# Patient Record
Sex: FEMALE | Employment: OTHER
[De-identification: names, ages, dates, MRNs, and addresses within clinical notes are randomized per-mention and may not be internally consistent; named-entity substitution may affect disease eponyms.]

---

## 2022-07-30 ENCOUNTER — TELEPHONE ENCOUNTER (OUTPATIENT)
Age: 77
End: 2022-07-30

## 2022-07-31 ENCOUNTER — TELEPHONE ENCOUNTER (OUTPATIENT)
Age: 77
End: 2022-07-31

## 2023-11-03 PROBLEM — M54.50 LOWER BACK PAIN: Status: ACTIVE | Noted: 2023-11-03

## 2023-11-03 PROBLEM — K43.2 INCISIONAL HERNIA OF ANTERIOR ABDOMINAL WALL WITHOUT OBSTRUCTION OR GANGRENE: Status: ACTIVE | Noted: 2023-11-03

## 2023-11-03 PROBLEM — M70.61 TROCHANTERIC BURSITIS OF BOTH HIPS: Status: ACTIVE | Noted: 2018-03-15

## 2023-11-03 PROBLEM — M70.62 TROCHANTERIC BURSITIS OF BOTH HIPS: Status: ACTIVE | Noted: 2018-03-15

## 2023-11-03 RX ORDER — HYDROXYCHLOROQUINE SULFATE 200 MG/1
TABLET, FILM COATED ORAL
COMMUNITY
End: 2024-02-16 | Stop reason: ALTCHOICE

## 2023-11-03 RX ORDER — CHOLECALCIFEROL (VITAMIN D3) 25 MCG
1 TABLET ORAL DAILY
COMMUNITY

## 2023-11-03 RX ORDER — COLCHICINE 0.6 MG/1
1 TABLET ORAL 2 TIMES DAILY
COMMUNITY
Start: 2016-12-13 | End: 2024-02-16 | Stop reason: ALTCHOICE

## 2023-11-03 RX ORDER — HYDROCHLOROTHIAZIDE 25 MG/1
TABLET ORAL
COMMUNITY
Start: 2017-07-19 | End: 2024-02-16 | Stop reason: ALTCHOICE

## 2023-11-03 RX ORDER — LEVOTHYROXINE SODIUM 100 UG/1
100 TABLET ORAL EVERY MORNING
COMMUNITY

## 2023-11-03 RX ORDER — EVOLOCUMAB 140 MG/ML
INJECTION, SOLUTION SUBCUTANEOUS
COMMUNITY

## 2023-11-03 RX ORDER — BENAZEPRIL HYDROCHLORIDE 20 MG/1
TABLET ORAL
COMMUNITY
Start: 2017-08-23 | End: 2024-02-16 | Stop reason: ALTCHOICE

## 2023-11-03 RX ORDER — CHLORTHALIDONE 25 MG/1
25 TABLET ORAL EVERY MORNING
COMMUNITY

## 2023-11-03 RX ORDER — VALSARTAN 40 MG/1
40 TABLET ORAL DAILY
COMMUNITY

## 2023-11-06 ENCOUNTER — OFFICE VISIT (OUTPATIENT)
Dept: GASTROENTEROLOGY | Facility: CLINIC | Age: 78
End: 2023-11-06
Payer: MEDICARE

## 2023-11-06 VITALS — BODY MASS INDEX: 34.07 KG/M2 | WEIGHT: 169 LBS | HEIGHT: 59 IN | HEART RATE: 66 BPM

## 2023-11-06 DIAGNOSIS — K58.0 IRRITABLE BOWEL SYNDROME WITH DIARRHEA: Primary | ICD-10-CM

## 2023-11-06 DIAGNOSIS — K21.9 GASTROESOPHAGEAL REFLUX DISEASE, UNSPECIFIED WHETHER ESOPHAGITIS PRESENT: ICD-10-CM

## 2023-11-06 PROCEDURE — 1036F TOBACCO NON-USER: CPT | Performed by: INTERNAL MEDICINE

## 2023-11-06 PROCEDURE — 1159F MED LIST DOCD IN RCRD: CPT | Performed by: INTERNAL MEDICINE

## 2023-11-06 PROCEDURE — 1160F RVW MEDS BY RX/DR IN RCRD: CPT | Performed by: INTERNAL MEDICINE

## 2023-11-06 PROCEDURE — 99204 OFFICE O/P NEW MOD 45 MIN: CPT | Performed by: INTERNAL MEDICINE

## 2023-11-06 RX ORDER — VERAPAMIL HYDROCHLORIDE 120 MG/1
120 TABLET, FILM COATED ORAL DAILY
COMMUNITY
Start: 2023-09-20

## 2023-11-06 RX ORDER — DESIPRAMINE HYDROCHLORIDE 10 MG/1
10 TABLET ORAL NIGHTLY
Qty: 30 TABLET | Refills: 2 | Status: SHIPPED | OUTPATIENT
Start: 2023-11-06 | End: 2024-01-24

## 2023-11-06 RX ORDER — DILTIAZEM HYDROCHLORIDE 120 MG/1
120 CAPSULE, COATED, EXTENDED RELEASE ORAL DAILY
COMMUNITY
Start: 2022-12-11 | End: 2022-12-18 | Stop reason: WASHOUT

## 2023-11-06 RX ORDER — ALBUTEROL SULFATE 90 UG/1
2 AEROSOL, METERED RESPIRATORY (INHALATION) EVERY 4 HOURS PRN
COMMUNITY
Start: 2023-01-18

## 2023-11-06 RX ORDER — FLUTICASONE PROPIONATE 50 MCG
1 SPRAY, SUSPENSION (ML) NASAL DAILY
COMMUNITY
Start: 2023-01-18

## 2023-11-06 RX ORDER — POTASSIUM CHLORIDE 750 MG/1
30 TABLET, EXTENDED RELEASE ORAL DAILY
COMMUNITY
Start: 2023-09-20

## 2023-11-06 RX ORDER — FAMOTIDINE 20 MG/1
20 TABLET, FILM COATED ORAL DAILY
Qty: 90 TABLET | Refills: 2 | Status: SHIPPED | OUTPATIENT
Start: 2023-11-06 | End: 2024-08-02

## 2023-11-06 RX ORDER — METHOCARBAMOL 500 MG/1
500 TABLET, FILM COATED ORAL 2 TIMES DAILY PRN
COMMUNITY
Start: 2023-03-21 | End: 2024-02-16 | Stop reason: ALTCHOICE

## 2023-11-06 RX ORDER — OMEPRAZOLE 20 MG/1
20 CAPSULE, DELAYED RELEASE ORAL DAILY
COMMUNITY
Start: 2023-05-19

## 2023-11-06 RX ORDER — CIMETIDINE 300 MG/1
300 TABLET, FILM COATED ORAL NIGHTLY
COMMUNITY
Start: 2022-12-07 | End: 2024-02-16 | Stop reason: ALTCHOICE

## 2023-11-06 RX ORDER — PREDNISONE 20 MG/1
20 TABLET ORAL DAILY
COMMUNITY
Start: 2022-12-07

## 2023-11-06 NOTE — PROGRESS NOTES
REASON FOR VISIT: To discuss history of IBS and reflux    HPI:  Renee Gonsalez is a 78 y.o. female with a past medical history of arthritis, obesity, IBS with diarrhea predominant symptoms and GERD being evaluated in the office for history of chronic IBS with intermittent diarrhea symptoms as well as chronic GERD.  Has history of reflux esophagitis on prior upper endoscopies.  Reports last colonoscopy was about 6 years ago and no polyps then.  Prior evaluation and work-up at the [facility].  Has not had any melena or bright red blood per rectum.  Will occasionally use Pepcid for reflux symptoms about 3 days a week.  No family history of inflammatory bowel disease or colorectal cancer.          PRIOR ENDOSCOPY    PAST MEDICAL HISTORY  No past medical history on file.    PAST SURGICAL HISTORY  No past surgical history on file.    FAMILY HISTORY  No family history on file.    SOCIAL HISTORY  Social History     Tobacco Use    Smoking status: Never     Passive exposure: Never    Smokeless tobacco: Never   Substance Use Topics    Alcohol use: Not on file       REVIEW OF SYSTEMS  CONSTITUTIONAL: negative for fever, chills, fatigue, or unintentional weight loss,   HEENT: negative for icteric sclera, eye pain/redness, or changes in vision/hearing  RESPIRATORY: negative for cough, hemoptysis, wheezing, orthopnea, or dyspnea on exertion  CARDIOVASCULAR: negative for chest pain, palpitations, or syncope   GASTROINTESTINAL: as noted per HPI  GENITOURINARY: negative for dysuria, polyuria, incontinence, or hematuria  MUSCULOSKELETAL: negative for arthralgia, myalgia, or joint swelling/stiffness   INTEGUMENTARY/SKIN: negative jaundice, rash, or skin lesion  HEMATOLOGIC/LYMPHATIC: negative for prolonged bleeding, easy bruising, or swollen lymph nodes  ENDOCRINE: negative for cold/heat intolerance, polydipsia, polyuria, or goiter  NEUROLOGIC: negative for headaches, dizziness, tremor, or gait abnormality  PSYCHIATRIC: negative  for anxiety, depression, personality changes, or sleep disturbances      A 10 point review of systems was completed and was otherwise negative.    ALLERGIES  Allergies   Allergen Reactions    Benzonatate Anaphylaxis    Diltiazem Hcl Anaphylaxis, Hives, Itching and Swelling    Doxycycline Anaphylaxis, Hives, Itching, Swelling and Rash    Metoprolol Anaphylaxis, Dermatitis, Hives, Rash and Swelling    Clindamycin Diarrhea, GI Upset and Nausea/vomiting    Gabapentin GI Upset and Unknown    Hydrocodone-Acetaminophen Nausea/vomiting    Macrolide Antibiotics GI Upset and Unknown    Opioids - Morphine Analogues Agitation, GI Upset, Hallucinations and Nausea/vomiting    Pneumococcal 23-Venus Ps Vaccine Bleeding, Diarrhea, GI Upset, Myalgia and Nausea/vomiting    Statins-Hmg-Coa Reductase Inhibitors Diarrhea and Myalgia       MEDICATIONS  Current Outpatient Medications   Medication Sig Dispense Refill    BIOTIN ORAL       chlorthalidone (Hygroton) 25 mg tablet Take 1 tablet (25 mg) by mouth once daily in the morning. With food. For 30 days      cholecalciferol (Vitamin D3) 25 MCG (1000 UT) tablet Take 1 tablet (25 mcg) by mouth once daily. For 30 days      evolocumab (Repatha SureClick) 140 mg/mL injection Inject under the skin 1 (one) time per week. As directed      levothyroxine (Synthroid) 100 mcg tablet Take 1 tablet (100 mcg) by mouth once daily in the morning. On an empty stomach. For 30 days      potassium chloride CR 10 mEq ER tablet Take 3 tablets (30 mEq) by mouth once daily.      TURMERIC ORAL       valsartan (Diovan) 40 mg tablet Take 1 tablet (40 mg) by mouth once daily.      verapamil (Calan) 120 mg tablet Take 1 tablet (120 mg) by mouth once daily.      albuterol 90 mcg/actuation inhaler Inhale 2 puffs every 4 hours if needed.      benazepril (Lotensin) 20 mg tablet Benazepril HCl - 20 MG Oral Tablet   Quantity: 90  Refills: 0        Start : 23-Aug-2017   Active      cimetidine (Tagamet) 300 mg tablet Take 1  "tablet (300 mg) by mouth once daily at bedtime.      colchicine, gout, 0.6 mg tablet Take 1 tablet (0.6 mg) by mouth 2 times a day.      desipramine (Norpramin) 10 mg tablet Take 1 tablet (10 mg) by mouth once daily at bedtime. 30 tablet 2    famotidine (Pepcid) 20 mg tablet Take 1 tablet (20 mg) by mouth once daily. 90 tablet 2    fluticasone (Flonase) 50 mcg/actuation nasal spray Administer 1 spray into each nostril once daily.      hydroCHLOROthiazide (HYDRODiuril) 25 mg tablet hydroCHLOROthiazide 25 MG Oral Tablet   Quantity: 90  Refills: 0        Start : 19-Jul-2017   Active      hydroxychloroquine (Plaquenil) 200 mg tablet Take by mouth. As directed      methocarbamol (Robaxin) 500 mg tablet Take 1 tablet (500 mg) by mouth 2 times a day as needed.      omeprazole (PriLOSEC) 20 mg DR capsule Take 1 capsule (20 mg) by mouth once daily.      predniSONE (Deltasone) 20 mg tablet Take 1 tablet (20 mg) by mouth once daily.       No current facility-administered medications for this visit.       VITALS  Pulse 66   Ht 1.499 m (4' 11\")   Wt 76.7 kg (169 lb)   BMI 34.13 kg/m²      PHYSICAL EXAM  Alert and oriented in no acute distress    ASSESSMENT/ PLAN  Patient with diarrhea predominant IBS as well as history of chronic GERD symptoms.  Prior history of reflux esophagitis.  For her GERD I did recommend increasing her acid suppressive therapy to daily especially considering her history of reflux esophagitis in the past.  We will stay on famotidine 20 mg once daily to be taken at least 30 minutes before dinner.  She does have nocturnal symptoms of GERD.  For diarrhea predominant IBS chronic symptoms will place on trial of low-dose desipramine 10 mg daily.  She will see me back in the office in about 3 months.  She is in agreement with the plan.        Signature: Rui Garcia MD    Date: 11/6/2023  Time: 2:57 PM    "

## 2024-01-23 DIAGNOSIS — K58.0 IRRITABLE BOWEL SYNDROME WITH DIARRHEA: ICD-10-CM

## 2024-01-24 RX ORDER — DESIPRAMINE HYDROCHLORIDE 10 MG/1
10 TABLET ORAL NIGHTLY
Qty: 30 TABLET | Refills: 0 | Status: SHIPPED | OUTPATIENT
Start: 2024-01-24 | End: 2024-02-16 | Stop reason: SDUPTHER

## 2024-02-16 ENCOUNTER — OFFICE VISIT (OUTPATIENT)
Dept: GASTROENTEROLOGY | Facility: CLINIC | Age: 79
End: 2024-02-16
Payer: MEDICARE

## 2024-02-16 VITALS — HEIGHT: 59 IN | OXYGEN SATURATION: 95 % | BODY MASS INDEX: 33.87 KG/M2 | WEIGHT: 168 LBS | HEART RATE: 66 BPM

## 2024-02-16 DIAGNOSIS — K21.9 GASTROESOPHAGEAL REFLUX DISEASE, UNSPECIFIED WHETHER ESOPHAGITIS PRESENT: Primary | ICD-10-CM

## 2024-02-16 DIAGNOSIS — K58.0 IRRITABLE BOWEL SYNDROME WITH DIARRHEA: ICD-10-CM

## 2024-02-16 PROCEDURE — 1160F RVW MEDS BY RX/DR IN RCRD: CPT | Performed by: INTERNAL MEDICINE

## 2024-02-16 PROCEDURE — 99213 OFFICE O/P EST LOW 20 MIN: CPT | Performed by: INTERNAL MEDICINE

## 2024-02-16 PROCEDURE — 1036F TOBACCO NON-USER: CPT | Performed by: INTERNAL MEDICINE

## 2024-02-16 PROCEDURE — 1159F MED LIST DOCD IN RCRD: CPT | Performed by: INTERNAL MEDICINE

## 2024-02-16 RX ORDER — DESIPRAMINE HYDROCHLORIDE 10 MG/1
10 TABLET ORAL NIGHTLY
Qty: 90 TABLET | Refills: 3 | Status: SHIPPED | OUTPATIENT
Start: 2024-02-16 | End: 2025-02-10

## 2024-02-16 NOTE — PROGRESS NOTES
REASON FOR VISIT: Follow-up IBS and GERD    HPI:  Renee Gonsalez is a 78 y.o. female with a past medical history of IBS with diarrhea predominant symptoms and GERD being evaluated in the office for follow-up of IBS and GERD.  At last visit started on low-dose desipramine 10 mg daily.  This has resulted in significant improvement with control of diarrhea and urgency symptoms.  Has been tolerating medication well.  GERD symptoms continue to be intermittent and she feels is dependent on her meal.  Uses famotidine now as needed.  Not having any dysphagia or odynophagia.  She is more comfortable with this approach than daily acid suppression.          PRIOR ENDOSCOPY    PAST MEDICAL HISTORY  No past medical history on file.    PAST SURGICAL HISTORY  No past surgical history on file.    FAMILY HISTORY  No family history on file.    SOCIAL HISTORY  Social History     Tobacco Use    Smoking status: Never     Passive exposure: Never    Smokeless tobacco: Never   Substance Use Topics    Alcohol use: Not on file       REVIEW OF SYSTEMS  CONSTITUTIONAL: negative for fever, chills, fatigue, or unintentional weight loss,   HEENT: negative for icteric sclera, eye pain/redness, or changes in vision/hearing  RESPIRATORY: negative for cough, hemoptysis, wheezing, orthopnea, or dyspnea on exertion  CARDIOVASCULAR: negative for chest pain, palpitations, or syncope   GASTROINTESTINAL: as noted per HPI  GENITOURINARY: negative for dysuria, polyuria, incontinence, or hematuria  MUSCULOSKELETAL: negative for arthralgia, myalgia, or joint swelling/stiffness   INTEGUMENTARY/SKIN: negative jaundice, rash, or skin lesion  HEMATOLOGIC/LYMPHATIC: negative for prolonged bleeding, easy bruising, or swollen lymph nodes  ENDOCRINE: negative for cold/heat intolerance, polydipsia, polyuria, or goiter  NEUROLOGIC: negative for headaches, dizziness, tremor, or gait abnormality  PSYCHIATRIC: negative for anxiety, depression, personality changes, or sleep  disturbances      A 10 point review of systems was completed and was otherwise negative.    ALLERGIES  Allergies   Allergen Reactions    Benzonatate Anaphylaxis    Diltiazem Hcl Anaphylaxis, Hives, Itching and Swelling    Doxycycline Anaphylaxis, Hives, Itching, Swelling and Rash    Metoprolol Anaphylaxis, Dermatitis, Hives, Rash and Swelling    Clindamycin Diarrhea, GI Upset and Nausea/vomiting    Gabapentin GI Upset and Unknown    Hydrocodone-Acetaminophen Nausea/vomiting    Macrolide Antibiotics GI Upset and Unknown    Opioids - Morphine Analogues Agitation, GI Upset, Hallucinations and Nausea/vomiting    Pneumococcal 23-Venus Ps Vaccine Bleeding, Diarrhea, GI Upset, Myalgia and Nausea/vomiting    Statins-Hmg-Coa Reductase Inhibitors Diarrhea and Myalgia       MEDICATIONS  Current Outpatient Medications   Medication Sig Dispense Refill    albuterol 90 mcg/actuation inhaler Inhale 2 puffs every 4 hours if needed.      BIOTIN ORAL       chlorthalidone (Hygroton) 25 mg tablet Take 1 tablet (25 mg) by mouth once daily in the morning. With food. For 30 days      cholecalciferol (Vitamin D3) 25 MCG (1000 UT) tablet Take 1 tablet (25 mcg) by mouth once daily. For 30 days      desipramine (Norpramin) 10 mg tablet Take 1 tablet (10 mg) by mouth once daily at bedtime. 90 tablet 3    evolocumab (Repatha SureClick) 140 mg/mL injection Inject under the skin 1 (one) time per week. As directed      famotidine (Pepcid) 20 mg tablet Take 1 tablet (20 mg) by mouth once daily. 90 tablet 2    fluticasone (Flonase) 50 mcg/actuation nasal spray Administer 1 spray into each nostril once daily.      levothyroxine (Synthroid) 100 mcg tablet Take 1 tablet (100 mcg) by mouth once daily in the morning. On an empty stomach. For 30 days      omeprazole (PriLOSEC) 20 mg DR capsule Take 1 capsule (20 mg) by mouth once daily.      potassium chloride CR 10 mEq ER tablet Take 3 tablets (30 mEq) by mouth once daily.      predniSONE (Deltasone) 20 mg  "tablet Take 1 tablet (20 mg) by mouth once daily.      TURMERIC ORAL       valsartan (Diovan) 40 mg tablet Take 1 tablet (40 mg) by mouth once daily.      verapamil (Calan) 120 mg tablet Take 1 tablet (120 mg) by mouth once daily.       No current facility-administered medications for this visit.       VITALS  Pulse 66   Ht 1.499 m (4' 11\")   Wt 76.2 kg (168 lb)   SpO2 95%   BMI 33.93 kg/m²      PHYSICAL EXAM  Alert and oriented in no acute distress    ASSESSMENT/ PLAN  Patient with diarrhea plan IBS much improved with symptoms on low-dose desipramine 10 mg daily.  Now using famotidine as needed for GERD.  Discussed lifestyle modifications including smaller meals and avoiding fatty meals and she expressed understanding.  She will see me back as needed.  No current dysphagia or dyne aphasia or other alarm symptoms.  She will notify me if any changes in symptoms and otherwise see me in the office as needed.        Signature: Rui Garcia MD    Date: 2/16/2024  Time: 1:57 PM    "

## 2024-08-01 DIAGNOSIS — K21.9 GASTROESOPHAGEAL REFLUX DISEASE, UNSPECIFIED WHETHER ESOPHAGITIS PRESENT: ICD-10-CM

## 2024-08-01 RX ORDER — FAMOTIDINE 20 MG/1
20 TABLET, FILM COATED ORAL DAILY
Qty: 90 TABLET | Refills: 0 | Status: SHIPPED | OUTPATIENT
Start: 2024-08-01

## 2025-01-08 ENCOUNTER — APPOINTMENT (OUTPATIENT)
Dept: GASTROENTEROLOGY | Facility: CLINIC | Age: 80
End: 2025-01-08
Payer: MEDICARE

## 2025-01-17 ENCOUNTER — APPOINTMENT (OUTPATIENT)
Dept: GASTROENTEROLOGY | Facility: CLINIC | Age: 80
End: 2025-01-17
Payer: MEDICARE

## 2025-01-17 VITALS — HEART RATE: 77 BPM | WEIGHT: 168 LBS | BODY MASS INDEX: 33.87 KG/M2 | HEIGHT: 59 IN

## 2025-01-17 DIAGNOSIS — K21.9 GASTROESOPHAGEAL REFLUX DISEASE, UNSPECIFIED WHETHER ESOPHAGITIS PRESENT: ICD-10-CM

## 2025-01-17 DIAGNOSIS — K58.0 IRRITABLE BOWEL SYNDROME WITH DIARRHEA: ICD-10-CM

## 2025-01-17 DIAGNOSIS — K59.00 CONSTIPATION, UNSPECIFIED CONSTIPATION TYPE: Primary | ICD-10-CM

## 2025-01-17 PROCEDURE — 99214 OFFICE O/P EST MOD 30 MIN: CPT | Performed by: INTERNAL MEDICINE

## 2025-01-17 PROCEDURE — 1160F RVW MEDS BY RX/DR IN RCRD: CPT | Performed by: INTERNAL MEDICINE

## 2025-01-17 PROCEDURE — 1159F MED LIST DOCD IN RCRD: CPT | Performed by: INTERNAL MEDICINE

## 2025-01-17 RX ORDER — FAMOTIDINE 20 MG/1
20 TABLET, FILM COATED ORAL DAILY
Qty: 90 TABLET | Refills: 2 | Status: SHIPPED | OUTPATIENT
Start: 2025-01-17

## 2025-01-17 RX ORDER — SOD SULF/POT CHLORIDE/MAG SULF 1.479 G
TABLET ORAL
Qty: 24 TABLET | Refills: 0 | Status: SHIPPED | OUTPATIENT
Start: 2025-01-17

## 2025-01-17 ASSESSMENT — ENCOUNTER SYMPTOMS
LOSS OF SENSATION IN FEET: 0
DEPRESSION: 0
OCCASIONAL FEELINGS OF UNSTEADINESS: 0

## 2025-01-17 NOTE — PROGRESS NOTES
REASON FOR VISIT: To discuss changes in bowel habits    HPI:  Renee Gonsalez is a 79 y.o. female with a past medical history of diarrhea predominant IBS and GERD being evaluated in the office for follow-up.  Has been doing very well with desipramine to control diarrhea symptoms.  Reports that for the past 5 weeks has had changes where she is having alternating diarrhea and constipation symptoms.  Constipation frequent however will have breakthrough significant diarrhea.  No rectal bleeding.  No recent travel.  No sick contacts.  Last colonoscopy at outside was about 8 years ago.  Denies prior polyps.          PRIOR ENDOSCOPY    PAST MEDICAL HISTORY  No past medical history on file.    PAST SURGICAL HISTORY  No past surgical history on file.    FAMILY HISTORY  No family history on file.    SOCIAL HISTORY  Social History     Tobacco Use    Smoking status: Never     Passive exposure: Never    Smokeless tobacco: Never   Substance Use Topics    Alcohol use: Not on file       REVIEW OF SYSTEMS  CONSTITUTIONAL: negative for fever, chills, fatigue, or unintentional weight loss,   HEENT: negative for icteric sclera, eye pain/redness, or changes in vision/hearing  RESPIRATORY: negative for cough, hemoptysis, wheezing, orthopnea, or dyspnea on exertion  CARDIOVASCULAR: negative for chest pain, palpitations, or syncope   GASTROINTESTINAL: as noted per HPI  GENITOURINARY: negative for dysuria, polyuria, incontinence, or hematuria  MUSCULOSKELETAL: negative for arthralgia, myalgia, or joint swelling/stiffness   INTEGUMENTARY/SKIN: negative jaundice, rash, or skin lesion  HEMATOLOGIC/LYMPHATIC: negative for prolonged bleeding, easy bruising, or swollen lymph nodes  ENDOCRINE: negative for cold/heat intolerance, polydipsia, polyuria, or goiter  NEUROLOGIC: negative for headaches, dizziness, tremor, or gait abnormality  PSYCHIATRIC: negative for anxiety, depression, personality changes, or sleep disturbances      A 10 point review  of systems was completed and was otherwise negative.    ALLERGIES  Allergies   Allergen Reactions    Benzonatate Anaphylaxis    Diltiazem Hcl Anaphylaxis, Hives, Itching and Swelling    Doxycycline Anaphylaxis, Hives, Itching, Swelling and Rash    Metoprolol Anaphylaxis, Dermatitis, Hives, Rash and Swelling    Clindamycin Diarrhea, GI Upset and Nausea/vomiting    Gabapentin GI Upset and Unknown    Hydrocodone-Acetaminophen Nausea/vomiting    Macrolide Antibiotics GI Upset and Unknown    Opioids - Morphine Analogues Agitation, GI Upset, Hallucinations and Nausea/vomiting    Pneumococcal 23-Venus Ps Vaccine Bleeding, Diarrhea, GI Upset, Myalgia and Nausea/vomiting    Statins-Hmg-Coa Reductase Inhibitors Diarrhea and Myalgia       MEDICATIONS  Current Outpatient Medications   Medication Sig Dispense Refill    albuterol 90 mcg/actuation inhaler Inhale 2 puffs every 4 hours if needed.      BIOTIN ORAL       chlorthalidone (Hygroton) 25 mg tablet Take 1 tablet (25 mg) by mouth once daily in the morning. With food. For 30 days      cholecalciferol (Vitamin D3) 25 MCG (1000 UT) tablet Take 1 tablet (25 mcg) by mouth once daily. For 30 days      desipramine (Norpramin) 10 mg tablet Take 1 tablet (10 mg) by mouth once daily at bedtime. 90 tablet 3    evolocumab (Repatha SureClick) 140 mg/mL injection Inject under the skin 1 (one) time per week. As directed      famotidine (Pepcid) 20 mg tablet TAKE 1 TABLET BY MOUTH EVERY DAY 90 tablet 0    fluticasone (Flonase) 50 mcg/actuation nasal spray Administer 1 spray into each nostril once daily.      levothyroxine (Synthroid) 100 mcg tablet Take 1 tablet (100 mcg) by mouth once daily in the morning. On an empty stomach. For 30 days      omeprazole (PriLOSEC) 20 mg DR capsule Take 1 capsule (20 mg) by mouth once daily.      potassium chloride CR 10 mEq ER tablet Take 3 tablets (30 mEq) by mouth once daily.      predniSONE (Deltasone) 20 mg tablet Take 1 tablet (20 mg) by mouth once  "daily.      TURMERIC ORAL       valsartan (Diovan) 40 mg tablet Take 1 tablet (40 mg) by mouth once daily.      verapamil (Calan) 120 mg tablet Take 1 tablet (120 mg) by mouth once daily.      sod sulf-pot chloride-mag sulf (Sutab) 1.479-0.188- 0.225 gram tablet tablet Starting at 6pm open one bottle of pills and fill glass provided with water and drink according to prep sheet. Start the 2nd bottle with directions on prep sheet 5 hours before procedure time 24 tablet 0     No current facility-administered medications for this visit.       VITALS  Pulse 77   Ht 1.499 m (4' 11\")   Wt 76.2 kg (168 lb)   BMI 33.93 kg/m²      PHYSICAL EXAM  Alert and oriented in no acute distress    ASSESSMENT/ PLAN  Patient with history of diarrhea-predominant IBS and chronic GERD for which she uses daily famotidine now with alternating diarrhea and constipation symptoms.  Clear changes over the past several weeks and her bowel pattern.  Given these changes I recommend colonoscopy to rule out underlying colorectal neoplasia or any inflammatory process.  If endoscopically normal may consider trial off desipramine to see if any improvement in her symptoms.  She is in agreement with the plan.        Signature: Rui Garcia MD    Date: 1/17/2025  Time: 3:09 PM    "

## 2025-01-25 DIAGNOSIS — K58.0 IRRITABLE BOWEL SYNDROME WITH DIARRHEA: ICD-10-CM

## 2025-01-27 RX ORDER — DESIPRAMINE HYDROCHLORIDE 10 MG/1
10 TABLET ORAL NIGHTLY
Qty: 90 TABLET | Refills: 2 | Status: SHIPPED | OUTPATIENT
Start: 2025-01-27

## 2025-02-03 DIAGNOSIS — K21.9 GASTROESOPHAGEAL REFLUX DISEASE, UNSPECIFIED WHETHER ESOPHAGITIS PRESENT: Primary | ICD-10-CM

## 2025-02-11 ENCOUNTER — APPOINTMENT (OUTPATIENT)
Dept: GASTROENTEROLOGY | Facility: EXTERNAL LOCATION | Age: 80
End: 2025-02-11
Payer: MEDICARE

## 2025-02-11 DIAGNOSIS — K59.00 CONSTIPATION: ICD-10-CM

## 2025-02-11 DIAGNOSIS — K21.9 GASTROESOPHAGEAL REFLUX DISEASE WITHOUT ESOPHAGITIS: Primary | ICD-10-CM

## 2025-02-11 DIAGNOSIS — K21.9 GASTROESOPHAGEAL REFLUX DISEASE, UNSPECIFIED WHETHER ESOPHAGITIS PRESENT: ICD-10-CM

## 2025-02-11 DIAGNOSIS — K31.89 OTHER DISEASES OF STOMACH AND DUODENUM: ICD-10-CM

## 2025-02-11 DIAGNOSIS — D12.4 BENIGN NEOPLASM OF DESCENDING COLON: ICD-10-CM

## 2025-02-11 DIAGNOSIS — R19.7 DIARRHEA, UNSPECIFIED TYPE: ICD-10-CM

## 2025-02-11 PROCEDURE — 45380 COLONOSCOPY AND BIOPSY: CPT | Performed by: INTERNAL MEDICINE

## 2025-02-11 PROCEDURE — 88305 TISSUE EXAM BY PATHOLOGIST: CPT

## 2025-02-11 PROCEDURE — 43239 EGD BIOPSY SINGLE/MULTIPLE: CPT | Performed by: INTERNAL MEDICINE

## 2025-02-11 PROCEDURE — 88305 TISSUE EXAM BY PATHOLOGIST: CPT | Performed by: PATHOLOGY

## 2025-02-11 PROCEDURE — 0753T DGTZ GLS MCRSCP SLD LEVEL IV: CPT

## 2025-02-11 PROCEDURE — 45385 COLONOSCOPY W/LESION REMOVAL: CPT | Performed by: INTERNAL MEDICINE

## 2025-02-11 PROCEDURE — G0500 MOD SEDAT ENDO SERVICE >5YRS: HCPCS | Performed by: INTERNAL MEDICINE

## 2025-02-12 ENCOUNTER — LAB REQUISITION (OUTPATIENT)
Dept: LAB | Facility: HOSPITAL | Age: 80
End: 2025-02-12
Payer: MEDICARE

## 2025-02-12 DIAGNOSIS — K59.00 CONSTIPATION, UNSPECIFIED: ICD-10-CM

## 2025-02-18 LAB
LABORATORY COMMENT REPORT: NORMAL
PATH REPORT.FINAL DX SPEC: NORMAL
PATH REPORT.GROSS SPEC: NORMAL
PATH REPORT.RELEVANT HX SPEC: NORMAL
PATH REPORT.TOTAL CANCER: NORMAL

## 2025-02-18 NOTE — RESULT ENCOUNTER NOTE
The polyp that was removed from your colon was an adenoma (benign but precancerous).  The biopsies performed in your colon did not show any evidence of microscopic colitis.  The biopsies that were performed in your stomach did not show any evidence of H. pylori bacterial infection.  The recommendation is to repeat colonoscopy in 5 years.  Please follow-up in the office as needed.      Rui Garcia MD

## 2025-03-14 ENCOUNTER — APPOINTMENT (OUTPATIENT)
Dept: GASTROENTEROLOGY | Facility: CLINIC | Age: 80
End: 2025-03-14
Payer: MEDICARE

## 2025-03-19 ENCOUNTER — APPOINTMENT (OUTPATIENT)
Dept: DERMATOLOGY | Facility: CLINIC | Age: 80
End: 2025-03-19
Payer: MEDICARE

## 2025-03-19 VITALS — DIASTOLIC BLOOD PRESSURE: 86 MMHG | SYSTOLIC BLOOD PRESSURE: 144 MMHG | HEART RATE: 69 BPM

## 2025-03-19 DIAGNOSIS — C44.622 SQUAMOUS CELL CARCINOMA OF SKIN OF FINGER OF RIGHT HAND: Primary | ICD-10-CM

## 2025-03-19 PROCEDURE — 17311 MOHS 1 STAGE H/N/HF/G: CPT | Performed by: DERMATOLOGY

## 2025-03-19 PROCEDURE — 99204 OFFICE O/P NEW MOD 45 MIN: CPT | Performed by: DERMATOLOGY

## 2025-03-19 NOTE — PROGRESS NOTES
Mohs Surgery Operative Note    Date of Surgery:  3/19/2025  Surgeon:  Dung Aguilera MD  Office Location: [address]  [facility] [address]  Dept: [phone]  Dept Fax: [phone]  Referring Provider: Chris Sinha MD  [address] [place] [address] [address]  [place] [address]      Assessment/Plan   Pre-procedure:   Obtained informed consent: written from patient  The surgical site was identified and confirmed with the patient.     Intra-operative:   Audible time out called at : 3:16 PM 03/19/25  by: Coni Lopez MA   Verified patient name, birthdate, site, specimen bottle label & requisition.    The planned procedure(s) was again reviewed with the patient. The risks of bleeding, infection, nerve damage and scarring were reviewed. Written authorization was obtained. The patient identity, surgical site, and planned procedure(s) were verified. The provider acted as both surgeon and pathologist.     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    Mohs surgery    Consent obtained: written    Universal Protocol:  Procedure explained and questions answered to patient or proxy's satisfaction: Yes    Test results available and properly labeled: Yes    Pathology report reviewed: Yes    External notes reviewed: Yes    Photo or diagram used for site identification: Yes    Site/side marked: Yes    Slide independently reviewed by Mohs surgeon: Yes    Immediately prior to procedure a time out was called: Yes    Patient identity confirmed: verbally with patient  Preparation: Patient was prepped and draped in usual sterile fashion      Anticoagulation:  Is the patient taking prescription anticoagulant and/or aspirin prescribed/recommended by a physician? No    Was the anticoagulation regimen changed prior to Mohs? No      Anesthesia:  Anesthesia method: local infiltration  Local anesthetic: lidocaine 1% WITH epi    Procedure Details:  Case ID Number:  [id number]  Biopsy accession number: [id number]  Date of biopsy: 1/21/2025  Pre-Op diagnosis: squamous cell carcinoma  Surgical site (from skin exam): Right Mid Dorsal Middle Finger  Pre-operative length (cm): 2  Pre-operative width (cm): 1.4  Indications for Mohs surgery: anatomic location where tissue conservation is critical  Previously treated? Yes    Previous treatment type: unknown procedure    Micrographic Surgery Details:  Post-operative length (cm): 2.3  Post-operative width (cm): 1.6  Number of Mohs stages: 1    Stage 1     Comments: The patient was brought into the operating room and placed in the procedure chair in the appropriate position.  The area positive by previous biopsy was identified and confirmed with the patient. The area of clinically obvious tumor was debulked using a curette and/or scalpel as needed. An incision was made following the Mohs approach through the skin. The specimen was taken to the lab, divided into 2 piece(s) and appropriately chromacoded and processed.     Tumor features identified on Mohs section: no tumor identified    Depth of defect: subcutaneous fat    Patient tolerance of procedure: tolerated well, no immediate complications    Reconstruction:  Was the defect reconstructed?: No     Repair: After a discussion with the patient regarding the options for wound closure, a decision was made to proceed with second intention healing.    Dressing/Follow-up: Surgifoam was placed in the wound. A pressure dressing was placed to help stabilize the wound and to minimize the risk of postoperative bleeding. Wound care was discussed, and the patient was given written post-operative wound care instructionsFine/surface layer approximation (top stitches)   Hemostasis achieved with: Gelfoam and electrodesiccation  Outcome: patient tolerated procedure well with no complications    Post-procedure details: sterile dressing applied and wound care instructions given    Dressing type: pressure  dressing      The final repair measured 2.3 x 1.6 cm            Wound care was discussed, and the patient was given written post-operative wound care instructions.      The patient will follow up with Dung Aguilera MD as needed for any post operative problems or concerns, and will follow up with their primary dermatologist as scheduled.

## 2025-03-19 NOTE — PROGRESS NOTES
Office Visit Note  Date: 3/19/2025  Surgeon:  Dung Aguilera MD  Office Location:  [address]  [facility] [address]  Dept: [phone]  Dept Fax: [phone]  Referring Provider: Chris Sinha MD  [address] [place] [address] [address]  [place] [address]    Subjective   Renee Gonsalez is a 80 y.o. female who presents for the following: MOHS Surgery    According to the patient, the lesion has been present for approximately greater than 1 year at the time of diagnosis.  The lesion is painful.  The lesion has not been treated previously.    The patient does not have a pacemaker / defibrillator.  The patient does not have a heart valve / joint replacement.    The patient is not on blood thinners.  The patient does not have a history of hepatitis B or C.  The patient does not have a history of HIV.  The patient does not have a history of immunosuppression (e.g. organ transplantation, malignancy, medications)    Review of Systems:  No other skin or systemic complaints other than what is documented elsewhere in the note.    MEDICAL HISTORY: clinically relevant history including significant past medical history, medications and allergies was reviewed and documented in Epic.    Objective   Well appearing patient in no apparent distress; mood and affect are within normal limits.  Vital signs: See record.  Noted on the Right Mid Dorsal Middle Finger  Is a 2 x 1.4 cm scar        The patient confirmed the identified site.    Discussion:  The nature of the diagnosis was explained. The lesion is a skin cancer.  It has a risk of local growth and distant spread. The condition is associated with sun exposure.  Warning signs of non-melanoma skin cancer discussed. Patient was instructed to perform monthly self skin examination.  We recommended that the patient have regular full skin exams given an increased risk of subsequent skin cancers. The patient was instructed to use sun  protective behaviors including use of broad spectrum sunscreens and sun protective clothing to reduce risk of skin cancers.      Risks, benefits, side effects of Mohs surgery were discussed with patient and the patient voiced understanding.  It was explained that even though the cure rate of Mohs is very high it is not 100%. Risks of surgery including but not limited to bleeding, infection, numbness, nerve damage, and scar were reviewed.  Discussion included wound care requirements, activity restrictions, likely scar outcome and time to heal.     After Mohs surgery, the defect may need to be repaired surgically and the scar may be longer than the original lesion.  Reconstruction options, risks, and benefits were reviewed including second intention healing, linear repair (4-1 ratio was explained), local flaps, skin grafts, cartilage grafts and interpolation flaps (the need for multiple surgeries was explained). Possible outcomes were reviewed including likely scar appearance, failure of flap survival, infection, bleeding and the need for revision surgery.     The pathology was reviewed, the photograph was reviewed, and the referring physician's note was reviewed.    Patient elected for Mohs surgery.    The patient has a squamous cell carcinoma.  The pathology was reviewed, the photograph was reviewed, and the referring physicians note was reviewed.  Multiple treatment options including mohs surgery (which has moderate risk of morbidity) were reviewed.    Medical Decision Making  Column 1- Squamous Cell Carcinoma (1 acute uncomplicated illness- Low)  Column 2- 3 tests reviewed (pathology, photograph, referring physician notes- Moderate)  Column 3- Modertate risk of morbidity from additional treatment- mohs surgry- Moderate)    Overall Moderate MDM

## 2025-03-19 NOTE — LETTER
MOH's Provider/Referral Letter Treatment Plan    Patient: Renee Gonsalez   YOB: 1945   Date of Visit: 3/19/2025   MRN: [id number]     Chris Sinha MD  [facility]   [address] [place] [address]    Dear Chris Sinha MD,     I had the pleasure of seeing Renee Gonsalez today in consultation at your request for evaluation and treatment of:  1. 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  After informed consent was obtained, the patient underwent the procedure without complication.    The skin cancer was removed, wound care instructions were given and the patient was advised to follow up with you.  I will see the patient post-operatively as indicated.    Thank you very much for your confidence in me and for allowing me to share in the care of this patient.    1. 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  Right Mid Dorsal Middle Finger  Is a 2 x 1.4 cm scar    Mohs surgery    Consent obtained: written    Universal Protocol:  Procedure explained and questions answered to patient or proxy's satisfaction: Yes    Test results available and properly labeled: Yes    Pathology report reviewed: Yes    External notes reviewed: Yes    Photo or diagram used for site identification: Yes    Site/side marked: Yes    Slide independently reviewed by Mohs surgeon: Yes    Immediately prior to procedure a time out was called: Yes    Patient identity confirmed: verbally with patient  Preparation: Patient was prepped and draped in usual sterile fashion      Anticoagulation:  Is the patient taking prescription anticoagulant and/or aspirin prescribed/recommended by a physician? No    Was the  anticoagulation regimen changed prior to Mohs? No      Anesthesia:  Anesthesia method: local infiltration  Local anesthetic: lidocaine 1% WITH epi    Procedure Details:  Case ID Number: [id number]  Biopsy accession number: [id number]  Date of biopsy: 1/21/2025  Pre-Op diagnosis: squamous cell carcinoma  Surgical site (from skin exam): Right Mid Dorsal Middle Finger  Pre-operative length (cm): 2  Pre-operative width (cm): 1.4  Indications for Mohs surgery: anatomic location where tissue conservation is critical  Previously treated? Yes    Previous treatment type: unknown procedure    Micrographic Surgery Details:  Post-operative length (cm): 2.3  Post-operative width (cm): 1.6  Number of Mohs stages: 1    Stage 1     Comments: The patient was brought into the operating room and placed in the procedure chair in the appropriate position.  The area positive by previous biopsy was identified and confirmed with the patient. The area of clinically obvious tumor was debulked using a curette and/or scalpel as needed. An incision was made following the Mohs approach through the skin. The specimen was taken to the lab, divided into 2 piece(s) and appropriately chromacoded and processed.     Tumor features identified on Mohs section: no tumor identified    Depth of defect: subcutaneous fat    Patient tolerance of procedure: tolerated well, no immediate complications    Reconstruction:  Was the defect reconstructed?: No     Repair: After a discussion with the patient regarding the options for wound closure, a decision was made to proceed with second intention healing.    Dressing/Follow-up: Surgifoam was placed in the wound. A pressure dressing was placed to help stabilize the wound and to minimize the risk of postoperative bleeding. Wound care was discussed, and the patient was given written post-operative wound care instructions.        Fine/surface layer approximation (top stitches)   Hemostasis achieved with: Gelfoam and  electrodesiccation  Outcome: patient tolerated procedure well with no complications    Post-procedure details: sterile dressing applied and wound care instructions given    Dressing type: pressure dressing      Staff Communication: Dermatology Local Anesthesia: Site Location: Right Dorsal Middle Finger 1 % Lidocaine / Epinephrine - Amount: 3 ml           Sincerely,       Dung Aguilera MD  [facility]

## 2025-04-16 ENCOUNTER — APPOINTMENT (OUTPATIENT)
Dept: GASTROENTEROLOGY | Facility: CLINIC | Age: 80
End: 2025-04-16
Payer: MEDICARE

## 2025-04-16 VITALS — HEIGHT: 59 IN | WEIGHT: 175 LBS | HEART RATE: 67 BPM | BODY MASS INDEX: 35.28 KG/M2

## 2025-04-16 DIAGNOSIS — K92.1 MELENA: Primary | ICD-10-CM

## 2025-04-16 DIAGNOSIS — K58.0 IRRITABLE BOWEL SYNDROME WITH DIARRHEA: ICD-10-CM

## 2025-04-16 DIAGNOSIS — K21.9 GASTROESOPHAGEAL REFLUX DISEASE WITHOUT ESOPHAGITIS: ICD-10-CM

## 2025-04-16 PROCEDURE — 1160F RVW MEDS BY RX/DR IN RCRD: CPT | Performed by: INTERNAL MEDICINE

## 2025-04-16 PROCEDURE — 99214 OFFICE O/P EST MOD 30 MIN: CPT | Performed by: INTERNAL MEDICINE

## 2025-04-16 PROCEDURE — 1159F MED LIST DOCD IN RCRD: CPT | Performed by: INTERNAL MEDICINE

## 2025-04-16 PROCEDURE — 1036F TOBACCO NON-USER: CPT | Performed by: INTERNAL MEDICINE

## 2025-04-16 NOTE — PROGRESS NOTES
REASON FOR VISIT: Follow-up with abdominal symptoms, reflux, black stools    HPI:  Renee Gonsalez is a 80 y.o. female with a past medical history of diarrhea felt to be diarrhea predominant IBS with recent colonoscopy showed no evidence of microscopic colitis.  Upper endoscopy without reflux esophagitis.  Managed chronically on acid suppressive therapy currently on famotidine.  Heartburn symptoms controlled.  Has had some recent epigastric discomfort.  Notes 2 days of dark black tarry stools.  No Pepto-Bismol or iron use.  No recent NSAID use.  Last upper endoscopy within 3 months without any peptic ulcers or esophagitis then.  No emesis.  In the past desipramine helped her symptoms of diarrhea but seem to not work as well after 8 months.  She does drink milk somewhat regularly as well as ice cream.  Her diarrhea symptoms continue intermittently.          PRIOR ENDOSCOPY    PAST MEDICAL HISTORY  Medical History[1]    PAST SURGICAL HISTORY  Surgical History[2]    FAMILY HISTORY  Family History[3]    SOCIAL HISTORY  Social History     Tobacco Use    Smoking status: Never     Passive exposure: Never    Smokeless tobacco: Never   Substance Use Topics    Alcohol use: Not Currently       REVIEW OF SYSTEMS  CONSTITUTIONAL: negative for fever, chills, fatigue, or unintentional weight loss,   HEENT: negative for icteric sclera, eye pain/redness, or changes in vision/hearing  RESPIRATORY: negative for cough, hemoptysis, wheezing, orthopnea, or dyspnea on exertion  CARDIOVASCULAR: negative for chest pain, palpitations, or syncope   GASTROINTESTINAL: as noted per HPI  GENITOURINARY: negative for dysuria, polyuria, incontinence, or hematuria  MUSCULOSKELETAL: negative for arthralgia, myalgia, or joint swelling/stiffness   INTEGUMENTARY/SKIN: negative jaundice, rash, or skin lesion  HEMATOLOGIC/LYMPHATIC: negative for prolonged bleeding, easy bruising, or swollen lymph nodes  ENDOCRINE: negative for cold/heat intolerance,  "polydipsia, polyuria, or goiter  NEUROLOGIC: negative for headaches, dizziness, tremor, or gait abnormality  PSYCHIATRIC: negative for anxiety, depression, personality changes, or sleep disturbances      A 10 point review of systems was completed and was otherwise negative.    ALLERGIES  Allergies[4]    MEDICATIONS  Current Medications[5]    VITALS  Pulse 67   Ht 1.499 m (4' 11\")   Wt 79.4 kg (175 lb)   BMI 35.35 kg/m²      PHYSICAL EXAM  Alert and oriented in no acute distress  Abdomen soft, no focal tenderness to palpation, no hepatosplenomegaly    ASSESSMENT/ PLAN  Patient with intermittent diarrhea symptoms.  Felt to be IBS.  In the past helped with desipramine.  I recommended restarting desipramine low-dose as well as modifying diet with a trial of low FODMAP diet.  2 days of melena and some upper abdominal discomfort.  Given her symptoms recommended repeat EGD to rule out underlying peptic ulcer.  No current dysphagia.  She is in agreement with the plan will follow-up with me at endoscopy.        Signature: Rui Garcia MD    Date: 4/16/2025  Time: 3:58 PM         [1] No past medical history on file.  [2] No past surgical history on file.  [3] No family history on file.  [4]   Allergies  Allergen Reactions    Benzonatate Anaphylaxis    Diltiazem Hcl Anaphylaxis, Hives, Itching and Swelling    Doxycycline Anaphylaxis, Hives, Itching, Swelling and Rash    Metoprolol Anaphylaxis, Dermatitis, Hives, Rash and Swelling    Clindamycin Diarrhea, GI Upset and Nausea/vomiting    Gabapentin GI Upset and Unknown    Hydrocodone-Acetaminophen Nausea/vomiting    Macrolide Antibiotics GI Upset and Unknown    Opioids - Morphine Analogues Agitation, GI Upset, Hallucinations and Nausea/vomiting    Pneumococcal 23-Venus Ps Vaccine Bleeding, Diarrhea, GI Upset, Myalgia and Nausea/vomiting    Statins-Hmg-Coa Reductase Inhibitors Diarrhea and Myalgia   [5]   Current Outpatient Medications   Medication Sig Dispense Refill    " albuterol 90 mcg/actuation inhaler Inhale 2 puffs every 4 hours if needed.      BIOTIN ORAL       chlorthalidone (Hygroton) 25 mg tablet Take 1 tablet (25 mg) by mouth once daily in the morning. With food. For 30 days      cholecalciferol (Vitamin D3) 25 MCG (1000 UT) tablet Take 1 tablet (25 mcg) by mouth once daily. For 30 days      desipramine (Norpramin) 10 mg tablet Take 1 tablet (10 mg) by mouth once daily at bedtime. 90 tablet 2    evolocumab (Repatha SureClick) 140 mg/mL injection Inject under the skin 1 (one) time per week. As directed      famotidine (Pepcid) 20 mg tablet Take 1 tablet (20 mg) by mouth once daily. 90 tablet 2    fluticasone (Flonase) 50 mcg/actuation nasal spray Administer 1 spray into each nostril once daily.      levothyroxine (Synthroid) 100 mcg tablet Take 1 tablet (100 mcg) by mouth once daily in the morning. On an empty stomach. For 30 days      omeprazole (PriLOSEC) 20 mg DR capsule Take 1 capsule (20 mg) by mouth once daily.      potassium chloride CR 10 mEq ER tablet Take 3 tablets (30 mEq) by mouth once daily.      predniSONE (Deltasone) 20 mg tablet Take 1 tablet (20 mg) by mouth once daily.      TURMERIC ORAL       valsartan (Diovan) 40 mg tablet Take 1 tablet (40 mg) by mouth once daily.      verapamil (Calan) 120 mg tablet Take 1 tablet (120 mg) by mouth once daily.      sod sulf-pot chloride-mag sulf (Sutab) 1.479-0.188- 0.225 gram tablet tablet Starting at 6pm open one bottle of pills and fill glass provided with water and drink according to prep sheet. Start the 2nd bottle with directions on prep sheet 5 hours before procedure time 24 tablet 0     No current facility-administered medications for this visit.

## 2025-04-18 ENCOUNTER — LAB REQUISITION (OUTPATIENT)
Dept: LAB | Facility: HOSPITAL | Age: 80
End: 2025-04-18
Payer: MEDICARE

## 2025-04-18 ENCOUNTER — OFFICE VISIT (OUTPATIENT)
Dept: GASTROENTEROLOGY | Facility: EXTERNAL LOCATION | Age: 80
End: 2025-04-18
Payer: MEDICARE

## 2025-04-18 DIAGNOSIS — K26.9 DU (DUODENAL ULCER): ICD-10-CM

## 2025-04-18 DIAGNOSIS — K31.89 OTHER DISEASES OF STOMACH AND DUODENUM: Primary | ICD-10-CM

## 2025-04-18 DIAGNOSIS — K26.9 DUODENAL ULCER: Primary | ICD-10-CM

## 2025-04-18 DIAGNOSIS — K92.1 MELENA: ICD-10-CM

## 2025-04-18 PROCEDURE — 88305 TISSUE EXAM BY PATHOLOGIST: CPT | Performed by: PATHOLOGY

## 2025-04-18 PROCEDURE — 43239 EGD BIOPSY SINGLE/MULTIPLE: CPT | Performed by: INTERNAL MEDICINE

## 2025-04-18 RX ORDER — OMEPRAZOLE 40 MG/1
40 CAPSULE, DELAYED RELEASE ORAL 2 TIMES DAILY
Qty: 60 CAPSULE | Refills: 0 | Status: SHIPPED | OUTPATIENT
Start: 2025-04-18 | End: 2025-06-17

## 2025-04-21 ENCOUNTER — APPOINTMENT (OUTPATIENT)
Dept: GASTROENTEROLOGY | Facility: CLINIC | Age: 80
End: 2025-04-21
Payer: MEDICARE

## 2025-05-01 NOTE — RESULT ENCOUNTER NOTE
The biopsies that were performed in your stomach did not show any evidence of H. pylori bacterial infection.  The recommendation is to follow-up in the office as needed.      Rui Garcia MD

## 2025-05-15 DIAGNOSIS — K26.9 DUODENAL ULCER: ICD-10-CM

## 2025-05-15 RX ORDER — OMEPRAZOLE 40 MG/1
40 CAPSULE, DELAYED RELEASE ORAL 2 TIMES DAILY
Qty: 90 CAPSULE | Refills: 3 | Status: SHIPPED | OUTPATIENT
Start: 2025-05-15 | End: 2025-11-11

## 2025-05-28 ENCOUNTER — APPOINTMENT (OUTPATIENT)
Dept: UROLOGY | Facility: HOSPITAL | Age: 80
End: 2025-05-28
Payer: MEDICARE

## 2025-06-18 ENCOUNTER — APPOINTMENT (OUTPATIENT)
Dept: GASTROENTEROLOGY | Facility: CLINIC | Age: 80
End: 2025-06-18
Payer: MEDICARE

## 2025-06-18 VITALS — HEART RATE: 70 BPM | HEIGHT: 59 IN | BODY MASS INDEX: 33.26 KG/M2 | WEIGHT: 165 LBS

## 2025-06-18 DIAGNOSIS — R19.7 DIARRHEA, UNSPECIFIED TYPE: Primary | ICD-10-CM

## 2025-06-18 DIAGNOSIS — K21.9 GASTROESOPHAGEAL REFLUX DISEASE WITHOUT ESOPHAGITIS: ICD-10-CM

## 2025-06-18 PROCEDURE — 1159F MED LIST DOCD IN RCRD: CPT | Performed by: INTERNAL MEDICINE

## 2025-06-18 PROCEDURE — 99214 OFFICE O/P EST MOD 30 MIN: CPT | Performed by: INTERNAL MEDICINE

## 2025-06-18 PROCEDURE — 1036F TOBACCO NON-USER: CPT | Performed by: INTERNAL MEDICINE

## 2025-06-18 RX ORDER — CHOLESTYRAMINE 4 G/9G
1 POWDER, FOR SUSPENSION ORAL DAILY
Qty: 30 PACKET | Refills: 1 | Status: SHIPPED | OUTPATIENT
Start: 2025-06-18

## 2025-06-18 NOTE — PROGRESS NOTES
REASON FOR VISIT: Follow-up diarrhea    HPI:  Renee Gonsalez is a 80 y.o. female here to follow-up on diarrhea symptoms.  Recent upper endoscopy with small duodenal ulceration shallow.  Treated with PPI therapy now off PPI using famotidine for intermittent GERD.  No further melena episodes.  Has history of diarrhea that has been intermittent at this point.  Came off milk without improvement.  States symptoms not resolving.  Remotely has history of C. difficile treated more than 10 years ago.  No rectal bleeding or melena.  She has had a prior cholecystectomy more than 20 years ago.        PRIOR ENDOSCOPY    PAST MEDICAL HISTORY  Medical History[1]    PAST SURGICAL HISTORY  Surgical History[2]    FAMILY HISTORY  Family History[3]    SOCIAL HISTORY  Social History     Tobacco Use    Smoking status: Never     Passive exposure: Never    Smokeless tobacco: Never   Substance Use Topics    Alcohol use: Not Currently       REVIEW OF SYSTEMS  CONSTITUTIONAL: negative for fever, chills, fatigue, or unintentional weight loss,   HEENT: negative for icteric sclera, eye pain/redness, or changes in vision/hearing  RESPIRATORY: negative for cough, hemoptysis, wheezing, orthopnea, or dyspnea on exertion  CARDIOVASCULAR: negative for chest pain, palpitations, or syncope   GASTROINTESTINAL: as noted per HPI  GENITOURINARY: negative for dysuria, polyuria, incontinence, or hematuria  MUSCULOSKELETAL: negative for arthralgia, myalgia, or joint swelling/stiffness   INTEGUMENTARY/SKIN: negative jaundice, rash, or skin lesion  HEMATOLOGIC/LYMPHATIC: negative for prolonged bleeding, easy bruising, or swollen lymph nodes  ENDOCRINE: negative for cold/heat intolerance, polydipsia, polyuria, or goiter  NEUROLOGIC: negative for headaches, dizziness, tremor, or gait abnormality  PSYCHIATRIC: negative for anxiety, depression, personality changes, or sleep disturbances      A 10 point review of systems was completed and was otherwise  "negative.    ALLERGIES  Allergies[4]    MEDICATIONS  Current Medications[5]    VITALS  Pulse 70   Ht 1.499 m (4' 11\")   Wt 74.8 kg (165 lb)   BMI 33.33 kg/m²      PHYSICAL EXAM  Alert and oriented in no acute distress    ASSESSMENT/ PLAN  Patient with diarrhea symptoms.  Prior workup for microscopic colitis negative.  Has remote history of C. difficile.  Prior cholecystectomy.  Will check stool for C. difficile though less likely.  Will place on cholestyramine once daily for possible bile salt induced diarrhea.  She is in agreement with the plan.  She will follow-up with me in 6 to 8 weeks.        Signature: Rui Garcia MD    Date: 6/18/2025  Time: 8:32 AM         [1] No past medical history on file.  [2] No past surgical history on file.  [3] No family history on file.  [4]   Allergies  Allergen Reactions    Benzonatate Anaphylaxis    Diltiazem Hcl Anaphylaxis, Hives, Itching and Swelling    Doxycycline Anaphylaxis, Hives, Itching, Swelling and Rash    Metoprolol Anaphylaxis, Dermatitis, Hives, Rash and Swelling    Clindamycin Diarrhea, GI Upset and Nausea/vomiting    Gabapentin GI Upset and Unknown    Hydrocodone-Acetaminophen Nausea/vomiting    Macrolide Antibiotics GI Upset and Unknown    Opioids - Morphine Analogues Agitation, GI Upset, Hallucinations and Nausea/vomiting    Pneumococcal 23-Venus Ps Vaccine Bleeding, Diarrhea, GI Upset, Myalgia and Nausea/vomiting    Statins-Hmg-Coa Reductase Inhibitors Diarrhea and Myalgia   [5]   Current Outpatient Medications   Medication Sig Dispense Refill    albuterol 90 mcg/actuation inhaler Inhale 2 puffs every 4 hours if needed.      BIOTIN ORAL       chlorthalidone (Hygroton) 25 mg tablet Take 1 tablet (25 mg) by mouth once daily in the morning. With food. For 30 days      cholecalciferol (Vitamin D3) 25 MCG (1000 UT) tablet Take 1 tablet (25 mcg) by mouth once daily. For 30 days      evolocumab (Repatha SureClick) 140 mg/mL injection Inject under the skin 1 (one) " time per week. As directed      famotidine (Pepcid) 20 mg tablet Take 1 tablet (20 mg) by mouth once daily. 90 tablet 2    fluticasone (Flonase) 50 mcg/actuation nasal spray Administer 1 spray into each nostril once daily.      levothyroxine (Synthroid) 100 mcg tablet Take 1 tablet (100 mcg) by mouth once daily in the morning. On an empty stomach. For 30 days      potassium chloride CR 10 mEq ER tablet Take 3 tablets (30 mEq) by mouth once daily.      valsartan (Diovan) 40 mg tablet Take 1 tablet (40 mg) by mouth once daily.      verapamil (Calan) 120 mg tablet Take 1 tablet (120 mg) by mouth once daily.      cholestyramine (Questran) 4 gram packet Take 1 packet (4 g) by mouth once daily. 30 packet 1    desipramine (Norpramin) 10 mg tablet Take 1 tablet (10 mg) by mouth once daily at bedtime. 90 tablet 2    omeprazole (PriLOSEC) 40 mg DR capsule Take 1 capsule (40 mg) by mouth 2 times a day. Do not crush or chew. 90 capsule 3    predniSONE (Deltasone) 20 mg tablet Take 1 tablet (20 mg) by mouth once daily. (Patient not taking: Reported on 6/18/2025)      sod sulf-pot chloride-mag sulf (Sutab) 1.479-0.188- 0.225 gram tablet tablet Starting at 6pm open one bottle of pills and fill glass provided with water and drink according to prep sheet. Start the 2nd bottle with directions on prep sheet 5 hours before procedure time 24 tablet 0     No current facility-administered medications for this visit.

## 2025-06-25 ENCOUNTER — APPOINTMENT (OUTPATIENT)
Dept: GASTROENTEROLOGY | Facility: CLINIC | Age: 80
End: 2025-06-25
Payer: MEDICARE

## 2025-06-26 LAB — C DIFF TOX GENS STL QL NAA+PROBE: NOT DETECTED

## 2025-07-25 ENCOUNTER — OFFICE VISIT (OUTPATIENT)
Dept: OBSTETRICS AND GYNECOLOGY | Facility: CLINIC | Age: 80
End: 2025-07-25
Payer: MEDICARE

## 2025-07-25 VITALS
BODY MASS INDEX: 32.86 KG/M2 | HEIGHT: 60 IN | DIASTOLIC BLOOD PRESSURE: 70 MMHG | SYSTOLIC BLOOD PRESSURE: 111 MMHG | WEIGHT: 167.4 LBS | HEART RATE: 62 BPM

## 2025-07-25 DIAGNOSIS — R35.0 FREQUENT URINATION: ICD-10-CM

## 2025-07-25 DIAGNOSIS — R15.9 INCONTINENCE OF FECES, UNSPECIFIED FECAL INCONTINENCE TYPE: ICD-10-CM

## 2025-07-25 DIAGNOSIS — N94.19 DYSPAREUNIA DUE TO MEDICAL CONDITION IN FEMALE: ICD-10-CM

## 2025-07-25 DIAGNOSIS — N95.2 VAGINAL ATROPHY: ICD-10-CM

## 2025-07-25 DIAGNOSIS — R39.9 UTI SYMPTOMS: ICD-10-CM

## 2025-07-25 DIAGNOSIS — N32.81 OAB (OVERACTIVE BLADDER): Primary | ICD-10-CM

## 2025-07-25 LAB
POC APPEARANCE, URINE: ABNORMAL
POC BILIRUBIN, URINE: NEGATIVE
POC BLOOD, URINE: NEGATIVE
POC COLOR, URINE: ABNORMAL
POC GLUCOSE, URINE: NEGATIVE MG/DL
POC KETONES, URINE: NEGATIVE MG/DL
POC LEUKOCYTES, URINE: ABNORMAL
POC NITRITE,URINE: POSITIVE
POC PH, URINE: 5.5 PH
POC PROTEIN, URINE: NEGATIVE MG/DL
POC SPECIFIC GRAVITY, URINE: 1.02
POC UROBILINOGEN, URINE: 0.2 EU/DL

## 2025-07-25 PROCEDURE — 81003 URINALYSIS AUTO W/O SCOPE: CPT | Mod: QW | Performed by: OBSTETRICS & GYNECOLOGY

## 2025-07-25 PROCEDURE — 99204 OFFICE O/P NEW MOD 45 MIN: CPT | Performed by: OBSTETRICS & GYNECOLOGY

## 2025-07-25 PROCEDURE — G2211 COMPLEX E/M VISIT ADD ON: HCPCS | Performed by: OBSTETRICS & GYNECOLOGY

## 2025-07-25 PROCEDURE — 1126F AMNT PAIN NOTED NONE PRSNT: CPT | Performed by: OBSTETRICS & GYNECOLOGY

## 2025-07-25 PROCEDURE — 99214 OFFICE O/P EST MOD 30 MIN: CPT | Performed by: OBSTETRICS & GYNECOLOGY

## 2025-07-25 PROCEDURE — 1159F MED LIST DOCD IN RCRD: CPT | Performed by: OBSTETRICS & GYNECOLOGY

## 2025-07-25 RX ORDER — ESTRADIOL 0.1 MG/G
0.5 CREAM VAGINAL DAILY
Qty: 42.5 G | Refills: 3 | Status: SHIPPED | OUTPATIENT
Start: 2025-07-25 | End: 2026-07-25

## 2025-07-25 ASSESSMENT — PAIN SCALES - GENERAL: PAINLEVEL_OUTOF10: 0-NO PAIN

## 2025-07-25 ASSESSMENT — ENCOUNTER SYMPTOMS
LOSS OF SENSATION IN FEET: 0
DEPRESSION: 0
OCCASIONAL FEELINGS OF UNSTEADINESS: 0

## 2025-07-25 NOTE — PATIENT INSTRUCTIONS
We discussed lifestyle changes includin) Aiming to drink around 60 oz total of fluids per day   2) Avoiding bladder irritants such as caffeine, nicotine, artificial sweeteners   3) Stop drinking fluids 3 hours before bedtime   4) Timed voids every 2-3 hours.      Fiber Therapy:    Fiber acts in the colon (large bowel) to make the stool soft.  If the stool is too hard, the fiber draws water in and makes it softer.  If the stool is too watery, it acts like a 'sponge' and soaks up the liquid.     Many foods contain fiber. Fruits, vegetables, whole grains, and high fiber cereals all contain some fiber. Unfortunately, most of us don’t eat enough and a fiber supplement is a good way to increase soluble fiber intake.     Supplemental fiber comes in many forms. You could choose from:    1.   Powder  2.   Tablets  3.   Wafers/Cookies/Gummies    Some common brands include:    1.   Benefiber (tasteless powder)  2.   Metamucil (powder)  3.   Konsyl (powder)  4.   Citrucel (powder, may cause less gas)  5.   Fiber-Con (tablet)    All of these products work in the same way, but some may work better than others for you.    Dosin.   One tablespoon of powder dissolves in 8-16 oz of water or juice OR  2.   Two tablets with 8-16 oz water or juice OR  3.   Two fiber wafers/cookies with 8-16 oz of water or juice    Take fiber in the morning. Start off using it once per day. You can then increase frequency if needed.    IF FIBER IS NOT TAKEN WITH ADEQUATE WATER/FLUID INTAKE, IT MAY BE CONSTIPATING.  DRINK 6-8 GLASSES OF WATER/LIQUIDS THROUGHOUT THE DAY WHILE TAKING FIBER SUPPLEMENTATION.    Fiber can cause gas/bloating, especially when first starting the treatment.  If this is the case, you can take simethicone (Gas-X) over the counter after meals and at bedtime as needed.    Summary:  1.  Remember: the most common cause of constipation is inadequate water intake during the day. Avoiding dehydration is usually the key to  success with fiber supplementation.  2. Using fiber requires some experimentation- if one brand doesn’t work or causes excessive gas, take another. Also, try varying the form of fiber- for example, if the powder is unpalatable; take the tablets or wafers instead.  3. You may need more than one dose per day- feel free to increase your dose to morning and mid-day if that works better.  4. Results depend on consistency of usage- the more consistent you are in taking fiber, the better the results!          Lubricants:     Use lubricants to make vaginal penetration more comfortable.  The list of lubricants below does not attempt to be complete, but rather describes several commonly use lubricants.  We do not officially recommend use of any 1 of these products, nor do we recommend anyone product over other products.  These products are available at grocery, pharmacy and health food stores or online by typing into [employer].          Some lubricants have glycerin and some are glycerin free.  Although not well researched glycerin has been shown to increase the production of yeast and irritation in the vagina for some women.  Propylene glycol is another ingredient used in some lubricants that can cause irritation for some women.  Remember mostly tubes impair sperm motility, so if trying to conceive, Pre-Seed is considered the best option.  We do not list food based oils because healthcare professionals caution against these.  The oil can get into the urethra and contribute to urinary tract and bladder infections.  Still silicone lubricants last longer but often needs to be cleaned off with soap and water.  If prone to yeast infections, you may also want to avoid silicone and stay with water-based lubes.  Do not mix silicone lube with silicone sex toys as it can cause the silicone in sex toys to deteriorate.          Lubricants:     Slippery stuff-water-based, water-soluble, non-glycerin, liquid or gel, latex compatible   ID  Millennium- silicone based, non-glycerin, latex compatible   Liquid Silver-silicone based, non-glycerin, latex compatible   Pre-seed- will not affect sperm motility, water-soluble   Astroglide- water-based, water-soluble, contains glycerin and propylene glycol, latex compatible   Domingo glide free-water-based, water-soluble, latex compatible, glycerin free contains propylene glycol   K-Y jelly-water-based, water-soluble, contains glycerin and propylene glycol, latex compatible   Lubrin-vaginal suppository, water-soluble, latex compatible   Replens-long-lasting, inserted by applicator into the vagina.  Can be used on a regular basis for vaginal moisturizing.  Contains glycerin, water-soluble.   Sylk- made of kiwi fruit vine and purified water.  From [place].  Marketed through whole foods.  Mimics natural secretions, contains glycerin   Surgilube-water-soluble, contains propylene glycol, latex compatible   Vitamin E oil-available in health food stores, natural, non-irritating qualities.   Saliva can be used for lubrication   Uberlube- Silicone based lubricant with Vitamin E      Hypoallergenic lubricants-organic and chemical free:   Pink- made with silicone, vitamin D and aloe vera, washes off easily.  Available online at [employer]   Just like me-made by pure romance, pH balanced, safe for latex or polyurethane condoms   Sensual organics   Good clean love

## 2025-07-25 NOTE — PROGRESS NOTES
[facility]   Hilary Luo MD, MPH   [phone]    ASSESSMENT AND PLAN:   80 y.o. female being assessed for OAB, FI, vaginal atrophy, and myofascial pelvic pain.     Diagnoses:   #1 OAB  #2 FI  #3 Myofascial pelvic pain  #4 Vaginal atrophy   #5 Dyspareunia     Plan:   1. OAB  - Discussed first-line intervention methods such as PFPT and lifestyle changes (i.e., limiting fluids to 60oz in total per day, timed voiding every 2-3 hours, stop drinking fluids 3 hours prior to bedtime).   - The second line treatment would be medications.  - PFPT requisition sent today. Given list of physical therapists with their corresponding locations/contact information.    2. FI  - Referred to PFPT.   - Continue to work with GI on diarrhea. Patient is not taking Cholestyramine because she had pain and diarrhea after she took it the first time.   - If improving stool consistency with Cholestyramine or fiber doesn't improve her FI, we can consider more invasive options to treat the FI.     3. Myofascial pelvic pain  - Referred to PFPT.     4. Vaginal atrophy, dyspareunia   - She will start estrogen cream transvaginally. Nightly x2 weeks, then twice weekly at night. We reassured her that using tv estrogen cream has a localized effect to the vaginal tissue and is not a form of HRT such as po estrogen which produces a systemic effect.  - Use Uberlube when she returns to intercourse.      Follow-up in 4 months with Dr. Luo.     Scribe Attestation:   Rebekah CROCKER, [place] scribing for virtually, and in the presence of Hilary Luo MD MPH on 07/25/2025 at 3:56 PM.     Problem List Items Addressed This Visit    None  Visit Diagnoses         OAB (overactive bladder)    -  Primary    Relevant Orders    Referral to Physical Therapy      Frequent urination        Relevant Orders    POCT UA Automated manually resulted (Completed)    Measure post void residual (Completed)    Referral to Physical Therapy     Urine Culture    Urinalysis with Reflex Microscopic      Incontinence of feces, unspecified fecal incontinence type        Relevant Orders    Referral to Physical Therapy      Dyspareunia due to medical condition in female        Relevant Medications    estradiol (Estrace) 0.01 % (0.1 mg/gram) vaginal cream    Other Relevant Orders    Referral to Physical Therapy      Vaginal atrophy        Relevant Medications    estradiol (Estrace) 0.01 % (0.1 mg/gram) vaginal cream           I spent a total of *** minutes in face to face and non face to face time.      Hilary Luo MD, MPH, FACOG     New    HISTORY OF PRESENT ILLNESS:     Renee Gonsalez is a 80 y.o. female who presents for Urinary Frequency and Pelvic Pain.     Record Review:   - 25 Dr. Garcia (GI) note: Has remote history of C. difficile. Prior steph. Will check stool for C. difficile though less likely. Will place on cholestyramine once daily.     Prolapse Symptoms:  - She does endorse a sensation of abdominal and vaginal pressure.   - Patient has felt like something is falling out of her vagina before, but this sensation resolved.      Urinary Symptoms:   - She feels a lot of pressure in her bladder. Pt thought it may have been a UTI, but she no longer thinks it is.   - Urine leakage started a few months ago.   - She leaks with UUI. UUI occurs multiple times daily. Most of the time she dribbles urine on the way to the toilet.   - Patient wears a pad when she goes out.   - Nocturia 2-3x per night.   - Denies hx of pyelo.   - Last UTI was years ago.   - Rarely has FLOR.   - Drinks 40 oz of water and 16 oz of iced tea.     Bowel Symptoms:   - She has diarrhea and frequent BMs.  - Endorses FI. She last had FI 2 weeks ago. FI occurs a few times per month.      OBGYN History and Sexual Activity:   - She is not having intercourse due to pain. She would like to return to penetrative intercourse with her .   - ,  x3   - Denies hx of OASI.         Past Medical History:     Medical History[1]       Past Surgical History:    - vaginal hysterectomy 1999 for uterine fibroids and pain  - she had multiple laparoscopies to evaluate her pain before getting the vaginal hyst   - cholecystectomy   - diastasis repair, done sometime between 1281-6009  - hernia repair on L side   Surgical History[2]      Medications:     Prior to Admission medications   Medication Sig Start Date End Date Taking? Authorizing Provider   albuterol 90 mcg/actuation inhaler Inhale 2 puffs every 4 hours if needed. 1/18/23  Yes Historical Provider, MD   BIOTIN ORAL  2/1/23  Yes Historical Provider, MD   chlorthalidone (Hygroton) 25 mg tablet Take 1 tablet (25 mg) by mouth once daily in the morning. With food. For 30 days   Yes Historical Provider, MD   cholecalciferol (Vitamin D3) 25 MCG (1000 UT) tablet Take 1 tablet (25 mcg) by mouth once daily. For 30 days   Yes Historical Provider, MD   cholestyramine (Questran) 4 gram packet Take 1 packet (4 g) by mouth once daily. 6/18/25  Yes Rui Garcia MD   evolocumab (Repatha SureClick) 140 mg/mL injection Inject under the skin 1 (one) time per week. As directed   Yes Historical Provider, MD   famotidine (Pepcid) 20 mg tablet Take 1 tablet (20 mg) by mouth once daily. 1/17/25  Yes Rui Garcia MD   fluticasone (Flonase) 50 mcg/actuation nasal spray Administer 1 spray into each nostril once daily. 1/18/23  Yes Historical Provider, MD   levothyroxine (Synthroid) 100 mcg tablet Take 1 tablet (100 mcg) by mouth once daily in the morning. On an empty stomach. For 30 days   Yes Historical Provider, MD   potassium chloride CR 10 mEq ER tablet Take 3 tablets (30 mEq) by mouth once daily. 9/20/23  Yes Historical Provider, MD   predniSONE (Deltasone) 20 mg tablet Take 1 tablet (20 mg) by mouth once daily. 12/7/22  Yes Historical Provider, MD   valsartan (Diovan) 40 mg tablet Take 1 tablet (40 mg) by mouth once daily.   Yes Historical Provider, MD   verapamil  (Calan) 120 mg tablet Take 1 tablet (120 mg) by mouth once daily. 9/20/23  Yes Historical Provider, MD   desipramine (Norpramin) 10 mg tablet Take 1 tablet (10 mg) by mouth once daily at bedtime. 1/27/25   Rui Garcia MD   estradiol (Estrace) 0.01 % (0.1 mg/gram) vaginal cream Insert 0.125 Applicatorfuls (0.5 g) into the vagina once daily. Nightly for 2 weeks, then twice weekly. 7/25/25 7/25/26  Hilary Luo MD MPH   omeprazole (PriLOSEC) 40 mg DR capsule Take 1 capsule (40 mg) by mouth 2 times a day. Do not crush or chew. 5/15/25 11/11/25  Rui Garcia MD   sod sulf-pot chloride-mag sulf (Sutab) 1.479-0.188- 0.225 gram tablet tablet Starting at 6pm open one bottle of pills and fill glass provided with water and drink according to prep sheet. Start the 2nd bottle with directions on prep sheet 5 hours before procedure time 1/17/25   Rui Garcia MD   dilTIAZem CD (Cardizem CD) 120 mg 24 hr capsule Take 1 capsule (120 mg) by mouth once daily. 12/11/22 12/18/22  Historical Provider, MD RAIN  Review of Systems       PHYSICAL EXAM:    /70 (Patient Position: Sitting)   Pulse 62   Ht (!) 1.524 m (5')   Wt 75.9 kg (167 lb 6.4 oz)   BMI 32.69 kg/m²   No LMP recorded. Patient is postmenopausal.    Declines chaperone for physical exam.      Well developed, well nourished, in no apparent distress.   Neurologic/Psychiatric:  Awake, Alert and Oriented times 3.  Affect normal.     GENITAL/URINARY:     External Genitalia:  The patient has normal appearing external genitalia, normal skenes and bartholins glands, and a normal hair distribution.  Her vulva is without lesions, erythema or discharge.  It is non-tender with appropriate sensation.     Urethral Meatus:  Size normal, Location normal, Lesions absent, Prolapse absent.    Urethra:  Fullness absent, Masses absent.    Bladder:  Fullness absent, Masses absent, Tenderness absent.    Vagina:  General appearance normal, Discharge absent, Lesions absent. Hypo  "estrogenic.     Cervix: Normal, no discharge.   Uterus:  surgically absent    Anus/Perineum:  Lesions absent and Masses absent defer exam  Normal Perineum    Physical Exam  Genitourinary:   POP-Q measurements were:      Aa: -2, Ba: -2, C: -10     gH: 4, pB: 6, TVL: 10     Ap: -1, Bp: -1, D:         Pain over R coccygeus and piriformis. No pain over the L coccygeus and piriformis.       Data and DIAGNOSTIC STUDIES REVIEWED   Imaging  No results found.    Cardiology, Vascular, and Other Imaging  No other imaging results found for the past 7 days     No results found for: \"URINECULTURE\", \"UAMICCOMM\"   Lab Results   Component Value Date    GLUCOSE 103 (H) 06/09/2021    CALCIUM 10.0 06/09/2021     06/09/2021    K 4.2 06/09/2021    CO2 29 06/09/2021     06/09/2021    BUN 17 06/09/2021    CREATININE 0.8 06/09/2021   No results found for: \"WBC\", \"HGB\", \"HCT\", \"MCV\", \"PLT\"          [1]   Past Medical History:  Diagnosis Date    Cancer (Multi) Skin cancers: Melanoma, basal cell, and squamous cell    Hypertension 1980's    Visual impairment [facility]intr 2025   [2]   Past Surgical History:  Procedure Laterality Date    CHOLECYSTECTOMY  1990's    HYSTERECTOMY  1990     "

## 2025-07-27 LAB
AMORPH SED URNS QL MICRO: ABNORMAL /HPF
APPEARANCE UR: ABNORMAL
BACTERIA #/AREA URNS HPF: ABNORMAL /HPF
BACTERIA UR CULT: ABNORMAL
BILIRUB UR QL STRIP: NEGATIVE
COLOR UR: YELLOW
GLUCOSE UR QL STRIP: NEGATIVE
HGB UR QL STRIP: NEGATIVE
HYALINE CASTS #/AREA URNS LPF: ABNORMAL /LPF
KETONES UR QL STRIP: ABNORMAL
LEUKOCYTE ESTERASE UR QL STRIP: ABNORMAL
NITRITE UR QL STRIP: POSITIVE
PH UR STRIP: 5.5 [PH] (ref 5–8)
PROT UR QL STRIP: NEGATIVE
RBC #/AREA URNS HPF: ABNORMAL /HPF
SERVICE CMNT-IMP: ABNORMAL
SP GR UR STRIP: 1.03 (ref 1–1.03)
SQUAMOUS #/AREA URNS HPF: ABNORMAL /HPF
WBC #/AREA URNS HPF: ABNORMAL /HPF

## 2025-07-28 RX ORDER — NITROFURANTOIN 25; 75 MG/1; MG/1
100 CAPSULE ORAL 2 TIMES DAILY
Qty: 10 CAPSULE | Refills: 0 | Status: SHIPPED | OUTPATIENT
Start: 2025-07-28 | End: 2025-08-02

## 2025-08-13 ENCOUNTER — APPOINTMENT (OUTPATIENT)
Dept: GASTROENTEROLOGY | Facility: CLINIC | Age: 80
End: 2025-08-13
Payer: MEDICARE

## 2025-08-13 VITALS — HEART RATE: 55 BPM | OXYGEN SATURATION: 96 % | WEIGHT: 166 LBS | BODY MASS INDEX: 32.59 KG/M2 | HEIGHT: 60 IN

## 2025-08-13 DIAGNOSIS — K21.9 GASTROESOPHAGEAL REFLUX DISEASE, UNSPECIFIED WHETHER ESOPHAGITIS PRESENT: ICD-10-CM

## 2025-08-13 DIAGNOSIS — R19.7 DIARRHEA, UNSPECIFIED TYPE: Primary | ICD-10-CM

## 2025-08-13 PROCEDURE — 1159F MED LIST DOCD IN RCRD: CPT | Performed by: INTERNAL MEDICINE

## 2025-08-13 PROCEDURE — 99214 OFFICE O/P EST MOD 30 MIN: CPT | Performed by: INTERNAL MEDICINE

## 2025-08-13 RX ORDER — LANOLIN ALCOHOL/MO/W.PET/CERES
1000 CREAM (GRAM) TOPICAL
COMMUNITY
Start: 2025-07-21

## 2025-08-15 DIAGNOSIS — R19.7 DIARRHEA, UNSPECIFIED TYPE: ICD-10-CM

## 2025-08-15 RX ORDER — CHOLESTYRAMINE 4 G/9G
1 POWDER, FOR SUSPENSION ORAL DAILY
Qty: 90 PACKET | Refills: 2 | Status: SHIPPED | OUTPATIENT
Start: 2025-08-15

## 2025-11-28 ENCOUNTER — APPOINTMENT (OUTPATIENT)
Dept: OBSTETRICS AND GYNECOLOGY | Facility: CLINIC | Age: 80
End: 2025-11-28
Payer: MEDICARE